# Patient Record
Sex: MALE | Race: WHITE | Employment: FULL TIME | URBAN - METROPOLITAN AREA
[De-identification: names, ages, dates, MRNs, and addresses within clinical notes are randomized per-mention and may not be internally consistent; named-entity substitution may affect disease eponyms.]

---

## 2017-05-10 ENCOUNTER — OFFICE VISIT (OUTPATIENT)
Dept: FAMILY MEDICINE CLINIC | Facility: CLINIC | Age: 62
End: 2017-05-10

## 2017-05-10 VITALS
SYSTOLIC BLOOD PRESSURE: 104 MMHG | OXYGEN SATURATION: 96 % | HEIGHT: 61.25 IN | DIASTOLIC BLOOD PRESSURE: 70 MMHG | BODY MASS INDEX: 32.24 KG/M2 | HEART RATE: 77 BPM | WEIGHT: 173 LBS

## 2017-05-10 DIAGNOSIS — E66.09 NON MORBID OBESITY DUE TO EXCESS CALORIES: ICD-10-CM

## 2017-05-10 DIAGNOSIS — G89.29 CHRONIC PAIN OF LEFT KNEE: ICD-10-CM

## 2017-05-10 DIAGNOSIS — Z00.01 ENCOUNTER FOR ROUTINE ADULT HEALTH EXAMINATION WITH ABNORMAL FINDINGS: Primary | ICD-10-CM

## 2017-05-10 DIAGNOSIS — M25.562 CHRONIC PAIN OF LEFT KNEE: ICD-10-CM

## 2017-05-10 DIAGNOSIS — Z28.21 IMMUNIZATION NOT CARRIED OUT BECAUSE OF PATIENT REFUSAL: ICD-10-CM

## 2017-05-10 DIAGNOSIS — Z87.891 FORMER SMOKER: ICD-10-CM

## 2017-05-10 DIAGNOSIS — Z12.11 ENCOUNTER FOR SCREENING COLONOSCOPY: ICD-10-CM

## 2017-05-10 PROCEDURE — 36415 COLL VENOUS BLD VENIPUNCTURE: CPT

## 2017-05-10 PROCEDURE — 99386 PREV VISIT NEW AGE 40-64: CPT

## 2017-05-10 RX ORDER — NAPROXEN 500 MG/1
500 TABLET ORAL 2 TIMES DAILY WITH MEALS
Qty: 60 TABLET | Refills: 1 | Status: SHIPPED | OUTPATIENT
Start: 2017-05-10 | End: 2019-07-09

## 2017-05-11 ENCOUNTER — TELEPHONE (OUTPATIENT)
Dept: FAMILY MEDICINE CLINIC | Facility: CLINIC | Age: 62
End: 2017-05-11

## 2017-05-11 ENCOUNTER — APPOINTMENT (OUTPATIENT)
Dept: LAB | Facility: HOSPITAL | Age: 62
End: 2017-05-11
Payer: MEDICAID

## 2017-05-11 DIAGNOSIS — Z00.01 ENCOUNTER FOR ROUTINE ADULT HEALTH EXAMINATION WITH ABNORMAL FINDINGS: ICD-10-CM

## 2017-05-11 PROCEDURE — 36415 COLL VENOUS BLD VENIPUNCTURE: CPT

## 2017-05-11 PROCEDURE — 81003 URINALYSIS AUTO W/O SCOPE: CPT

## 2017-05-11 PROCEDURE — 80061 LIPID PANEL: CPT

## 2017-05-11 PROCEDURE — 80053 COMPREHEN METABOLIC PANEL: CPT

## 2017-05-11 PROCEDURE — 85027 COMPLETE CBC AUTOMATED: CPT

## 2017-05-11 NOTE — PATIENT INSTRUCTIONS
Pautas de prevención para hombres de 48 a 59 años de [address] [facility] de detección y las vacunas son importantes para el manejo de lu nasir.  La consejería sobre lu nasir también es esencial. A continuación, verá pautas en relación con esos temas para ho Cáncer de pulmón Adultos entre 54 y [de-identified] años que hayan fumado Detección anual para los que tienen antecedentes de fumar 27 paquetes por año o que dejaron de fumar en los últimos 13 años   Obesidad Todos los hombres de ancelmo shellie de edad En los exámenes de [place] Lisa Keith (“MMR” por isiah siglas en [facility]) Todos los hombres de ancelmo shellie de edad hasta alrededor de los 61 años que no tienen registro de mag tenido estas infecciones o haberse aplicado estas vacunas [address] dosis.  Consulte a lu pr © 3215-4646 [address], [address] [place]. All rights reserved. This information is not intended as a substitute for professional medical care. Always follow your healthcare professional's instructions.

## 2017-05-11 NOTE — PROGRESS NOTES
CC: Annual Physical Exam    HPI:   Karuna Sims is a 62year old male who presents for a complete physical exam. Pt complains of intermittent L knee pain for the past few months.  He states that his knee sometimes feels stiff but his symptoms improve as the or swelling, + joint pain and stiffness. NEUROLOGICAL:  Denies headache, seizures, dizziness, syncope, paralysis, ataxia, numbness or tingling in the extremities,change in bowel or bladder control. HEMATOLOGIC:  Denies anemia, bleeding or bruising.   LYMP normal gait, strength and tone, sensory intact, normal reflexes.     ASSESSMENT AND PLAN:   Kristin Nguyen is a 62year old male who presents for a complete physical exam.      Health maintenance, will check:   Orders Placed This Encounter  CBC, Platelet, No

## 2017-05-12 DIAGNOSIS — E78.2 MIXED HYPERLIPIDEMIA: Primary | ICD-10-CM

## 2017-05-12 PROBLEM — R73.09 ELEVATED GLUCOSE LEVEL: Status: ACTIVE | Noted: 2017-05-12

## 2017-05-17 ENCOUNTER — TELEPHONE (OUTPATIENT)
Dept: FAMILY MEDICINE CLINIC | Facility: CLINIC | Age: 62
End: 2017-05-17

## 2017-05-17 RX ORDER — FENOFIBRATE 160 MG/1
160 TABLET ORAL DAILY
Qty: 30 TABLET | Refills: 2 | Status: SHIPPED | OUTPATIENT
Start: 2017-05-17 | End: 2017-08-10

## 2017-05-17 NOTE — TELEPHONE ENCOUNTER
Per Dr Chanelle Foss have continue medication he brought from [facility], (Controlip) which is the same as Fenofibrate and send new Rx for it.   Rx sent to his pharmacy and patient informed about results and instructions to continue with cholesterol medication, Dr Chanelle Foss

## 2017-05-17 NOTE — TELEPHONE ENCOUNTER
----- Message from Maricruz Paredes MD sent at 5/12/2017  2:42 AM CDT -----  Recommend diet/exercise and Omega 3 fish oil pills 2-4g PO daily and repeat BW (CMP, lipid panel) in 3 months; ok to file.

## 2017-08-09 ENCOUNTER — TELEPHONE (OUTPATIENT)
Dept: FAMILY MEDICINE CLINIC | Facility: CLINIC | Age: 62
End: 2017-08-09

## 2017-08-10 RX ORDER — FENOFIBRATE 160 MG/1
160 TABLET ORAL DAILY
Qty: 30 TABLET | Refills: 0 | Status: SHIPPED | OUTPATIENT
Start: 2017-08-10 | End: 2017-09-07

## 2017-09-07 ENCOUNTER — OFFICE VISIT (OUTPATIENT)
Dept: FAMILY MEDICINE CLINIC | Facility: CLINIC | Age: 62
End: 2017-09-07

## 2017-09-07 VITALS
BODY MASS INDEX: 33 KG/M2 | OXYGEN SATURATION: 98 % | WEIGHT: 175 LBS | SYSTOLIC BLOOD PRESSURE: 120 MMHG | DIASTOLIC BLOOD PRESSURE: 70 MMHG | HEART RATE: 80 BPM

## 2017-09-07 DIAGNOSIS — E78.2 MIXED HYPERLIPIDEMIA: ICD-10-CM

## 2017-09-07 DIAGNOSIS — J06.9 VIRAL UPPER RESPIRATORY TRACT INFECTION: Primary | ICD-10-CM

## 2017-09-07 DIAGNOSIS — Z13.31 DEPRESSION SCREENING: ICD-10-CM

## 2017-09-07 PROCEDURE — 99214 OFFICE O/P EST MOD 30 MIN: CPT

## 2017-09-07 RX ORDER — PROMETHAZINE HYDROCHLORIDE AND CODEINE PHOSPHATE 6.25; 1 MG/5ML; MG/5ML
5 SYRUP ORAL EVERY 6 HOURS PRN
Qty: 240 ML | Refills: 0 | Status: SHIPPED | OUTPATIENT
Start: 2017-09-07 | End: 2017-11-22 | Stop reason: ALTCHOICE

## 2017-09-07 RX ORDER — FENOFIBRATE 160 MG/1
160 TABLET ORAL DAILY
Qty: 30 TABLET | Refills: 0 | Status: SHIPPED | OUTPATIENT
Start: 2017-09-07 | End: 2017-09-21

## 2017-09-08 PROBLEM — J06.9 VIRAL UPPER RESPIRATORY TRACT INFECTION: Status: ACTIVE | Noted: 2017-09-08

## 2017-09-08 PROBLEM — Z13.31 DEPRESSION SCREENING: Status: ACTIVE | Noted: 2017-09-08

## 2017-09-08 NOTE — PATIENT INSTRUCTIONS
Enfermedad Respiratoria Viral [Uri, Viral Respiratory Illness, Adult, No Abx]  Usted tiene rayna enfermedad respiratoria de las vías superiores provocada por un virus. Esta enfermedad es contagiosa eloy los primeros días.  Se transmite por el aire, por l Busque Prontamente Atención Médica  si algo de lo siguiente ocurre:  -- Tos con esputo de color (mucosidad) o con jose.   -- Dolor en el pecho, falta de aire, silbidos o dificultad para respirar.  -- Dolor de alba aisha, dolor en la emma, el sumanth o l

## 2017-09-08 NOTE — PROGRESS NOTES
HPI:    Patient ID: Lexi Aguirre is a 62year old male. Hyperlipidemia   This is a chronic problem. The current episode started more than 1 year ago. The problem is uncontrolled. Exacerbating diseases include obesity.  Factors aggravating his hyperlipide for headaches. Negative for dizziness, focal weakness, syncope and light-headedness. Psychiatric/Behavioral: Positive for sleep disturbance. All other systems reviewed and are negative.              Current Outpatient Prescriptions:  Fenofibrate 160 MG importance of compliance with medication regimen reviewed and discussed with pt. Repeat CMP and lipid panel in 3 months.     3. Depression screening    Depression Screening (PHQ-2/PHQ-9): Over the LAST 2 WEEKS   Little interest or pleasure in doing things

## 2017-09-21 ENCOUNTER — TELEPHONE (OUTPATIENT)
Dept: FAMILY MEDICINE CLINIC | Facility: CLINIC | Age: 62
End: 2017-09-21

## 2017-09-21 DIAGNOSIS — E78.2 MIXED HYPERLIPIDEMIA: ICD-10-CM

## 2017-09-21 RX ORDER — FENOFIBRATE 160 MG/1
160 TABLET ORAL DAILY
Qty: 30 TABLET | Refills: 0 | Status: SHIPPED | OUTPATIENT
Start: 2017-09-21 | End: 2017-11-10

## 2017-09-21 NOTE — TELEPHONE ENCOUNTER
Patient called if we are able to resend the medication to his new pharmacy. [facility] Is not accepting his insurance card.

## 2017-11-10 ENCOUNTER — TELEPHONE (OUTPATIENT)
Dept: FAMILY MEDICINE CLINIC | Facility: CLINIC | Age: 62
End: 2017-11-10

## 2017-11-10 DIAGNOSIS — E78.2 MIXED HYPERLIPIDEMIA: ICD-10-CM

## 2017-11-10 RX ORDER — FENOFIBRATE 160 MG/1
160 TABLET ORAL DAILY
Qty: 15 TABLET | Refills: 0 | Status: SHIPPED | OUTPATIENT
Start: 2017-11-10 | End: 2017-11-22

## 2017-11-15 ENCOUNTER — APPOINTMENT (OUTPATIENT)
Dept: LAB | Facility: HOSPITAL | Age: 62
End: 2017-11-15
Payer: MEDICAID

## 2017-11-15 DIAGNOSIS — E78.2 MIXED HYPERLIPIDEMIA: ICD-10-CM

## 2017-11-15 PROCEDURE — 80061 LIPID PANEL: CPT

## 2017-11-15 PROCEDURE — 80053 COMPREHEN METABOLIC PANEL: CPT

## 2017-11-15 PROCEDURE — 36415 COLL VENOUS BLD VENIPUNCTURE: CPT

## 2017-11-22 ENCOUNTER — OFFICE VISIT (OUTPATIENT)
Dept: FAMILY MEDICINE CLINIC | Facility: CLINIC | Age: 62
End: 2017-11-22

## 2017-11-22 VITALS
OXYGEN SATURATION: 97 % | HEIGHT: 62 IN | DIASTOLIC BLOOD PRESSURE: 70 MMHG | SYSTOLIC BLOOD PRESSURE: 110 MMHG | WEIGHT: 174 LBS | HEART RATE: 59 BPM | BODY MASS INDEX: 32.02 KG/M2

## 2017-11-22 DIAGNOSIS — E78.2 MIXED HYPERLIPIDEMIA: Primary | ICD-10-CM

## 2017-11-22 DIAGNOSIS — Z23 NEED FOR INFLUENZA VACCINATION: ICD-10-CM

## 2017-11-22 PROBLEM — J06.9 VIRAL UPPER RESPIRATORY TRACT INFECTION: Status: RESOLVED | Noted: 2017-09-08 | Resolved: 2017-11-22

## 2017-11-22 PROCEDURE — 90686 IIV4 VACC NO PRSV 0.5 ML IM: CPT

## 2017-11-22 PROCEDURE — 99213 OFFICE O/P EST LOW 20 MIN: CPT

## 2017-11-22 PROCEDURE — 90471 IMMUNIZATION ADMIN: CPT

## 2017-11-22 RX ORDER — FENOFIBRATE 160 MG/1
160 TABLET ORAL DAILY
Qty: 30 TABLET | Refills: 2 | Status: SHIPPED | OUTPATIENT
Start: 2017-11-22 | End: 2019-07-09

## 2017-11-22 NOTE — PROGRESS NOTES
HPI:    Patient ID: Zenia Dyre is a 62year old male. Hyperlipidemia   This is a chronic problem. Episode onset: 6 months ago. The problem is uncontrolled. Exacerbating diseases include obesity.  Factors aggravating his hyperlipidemia include fatty zana Effort normal and breath sounds normal. No respiratory distress. Neurological: He is alert and oriented to person, place, and time. Skin: Skin is warm. No rash noted. Nursing note and vitals reviewed. ASSESSMENT/PLAN:   1.  Mixed hyperlipi

## 2017-11-22 NOTE — PATIENT INSTRUCTIONS
Cambios del estilo de rinku para controlar el colesterol  Seguir rayna dieta St jg, hacer ejercicio, controlar lu peso, dejar de fumar, manejar isiah niveles de estrés y natanael los medicamentos necesarios son todas cosas que pueden ayudarle a controlar lu n Lu proveedor de [address] puede recomendarle que stephanie más actividad física si no olivo estado haciendo ejercicio.  Según cuál sea lu situación, puede que lu proveedor le recomiende hacer actividad física de nivel entre moderado e intenso eloy al [employer] técnicas para manejar el estrés que le ayuden a lidiar con el estrés presente en lu rinku laboral y personal. [employer] puede disminuir en gran medida lu rriesgo de desarrollar rayna enfermedad cardiovascular.   Cómo aprovechar mejor los [facility] [employer] · Adultos que lion tenido un ataque cardíaco o cerebral, O adultos que lion tenido rayna enfermedad vascular periférica, un mini ataque cerebral (ataque isquémico transitorio), o rayna angina estable o inestable.  Bettye shellie también incluye a las personas que lion

## 2018-11-13 ENCOUNTER — HOSPITAL ENCOUNTER (OUTPATIENT)
Dept: GENERAL RADIOLOGY | Age: 63
Discharge: HOME | End: 2018-11-13
Attending: FAMILY MEDICINE

## 2018-11-13 ENCOUNTER — OFFICE VISIT (OUTPATIENT)
Dept: FAMILY MEDICINE CLINIC | Facility: CLINIC | Age: 63
End: 2018-11-13
Payer: COMMERCIAL

## 2018-11-13 VITALS
SYSTOLIC BLOOD PRESSURE: 122 MMHG | HEART RATE: 66 BPM | BODY MASS INDEX: 30 KG/M2 | DIASTOLIC BLOOD PRESSURE: 80 MMHG | WEIGHT: 164.69 LBS

## 2018-11-13 DIAGNOSIS — M54.50 BILATERAL LOW BACK PAIN WITHOUT SCIATICA, UNSPECIFIED CHRONICITY: Primary | ICD-10-CM

## 2018-11-13 DIAGNOSIS — M54.50 BILATERAL LOW BACK PAIN WITHOUT SCIATICA, UNSPECIFIED CHRONICITY: ICD-10-CM

## 2018-11-13 PROCEDURE — 72110 X-RAY EXAM L-2 SPINE 4/>VWS: CPT | Performed by: FAMILY MEDICINE

## 2018-11-13 PROCEDURE — 99213 OFFICE O/P EST LOW 20 MIN: CPT | Performed by: FAMILY MEDICINE

## 2018-11-13 RX ORDER — NAPROXEN 500 MG/1
500 TABLET ORAL 2 TIMES DAILY WITH MEALS
Qty: 60 TABLET | Refills: 1 | Status: SHIPPED | OUTPATIENT
Start: 2018-11-13 | End: 2018-12-04
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
## 2018-12-02 DIAGNOSIS — E78.2 MIXED HYPERLIPIDEMIA: ICD-10-CM

## 2018-12-03 PROBLEM — N52.9 ERECTILE DYSFUNCTION: Status: ACTIVE | Noted: 2018-12-03

## 2018-12-03 RX ORDER — FENOFIBRATE 160 MG/1
TABLET ORAL
Qty: 30 TABLET | Refills: 0 | OUTPATIENT
Start: 2018-12-03

## 2019-07-05 ENCOUNTER — LAB ENCOUNTER (OUTPATIENT)
Dept: LAB | Facility: HOSPITAL | Age: 64
End: 2019-07-05
Attending: UROLOGY
Payer: COMMERCIAL

## 2019-07-05 DIAGNOSIS — N40.1 HYPERTROPHY OF PROSTATE WITH URINARY OBSTRUCTION: Primary | ICD-10-CM

## 2019-07-05 DIAGNOSIS — N13.8 HYPERTROPHY OF PROSTATE WITH URINARY OBSTRUCTION: Primary | ICD-10-CM

## 2019-07-05 LAB — PSA SERPL-MCNC: 5.35 NG/ML (ref ?–4)

## 2019-07-05 PROCEDURE — 36415 COLL VENOUS BLD VENIPUNCTURE: CPT

## 2019-07-05 PROCEDURE — 84153 ASSAY OF PSA TOTAL: CPT

## 2019-07-25 ENCOUNTER — LAB ENCOUNTER (OUTPATIENT)
Dept: LAB | Facility: HOSPITAL | Age: 64
End: 2019-07-25
Attending: UROLOGY
Payer: COMMERCIAL

## 2019-07-25 DIAGNOSIS — R97.20 ELEVATED PROSTATE SPECIFIC ANTIGEN (PSA): Primary | ICD-10-CM

## 2019-07-25 LAB — PSA SERPL-MCNC: 4.62 NG/ML (ref ?–4)

## 2019-07-25 PROCEDURE — 84153 ASSAY OF PSA TOTAL: CPT

## 2019-07-25 PROCEDURE — 36415 COLL VENOUS BLD VENIPUNCTURE: CPT

## (undated) NOTE — MR AVS SNAPSHOT
[address] at [address] [place] [address].  [address]  [place] [address]  [phone]               Thank you for choosing us for your health care visit with Joy Vang MD.  We are glad to serve you and happy to prov [address] [place] [address]   Phone:  [phone]   Fax:  [phone]    Diagnoses:  Encounter for screening colonoscopy   Order:  Veda Tang [facility] [id number]   Phone:  [phone] 49 y 59 años de edad. Hable con lu proveedor de [facility] para asegurarse de que está al día con todo lo que necesita.   Prueba/Análisis Quiénes Frecuencia   Uso indebido del alcohol [employer] hombres de ancelmo shellie de edad En los exámenes de [facility] Cáncer de próstata A partir de los 39 años de edad, hable con lu proveedor de atención médica [employer] riesgos y los beneficios del examen rectal digital (\"ELIOT\" por isiah siglas en inglés) y las pruebas de detección de antígeno prostático específico (\"PS Antimeningocócica Los hombres con mayor riesgo de infección; hable con lu proveedor de atención médica [place] dosis o más   Antineumocócica conjugada (PCV13) y de polisacáridos (PPSV23) Los hombres con mayor riesgo de infección; hable con lu proveedor de aten This list is accurate as of: 5/10/17  9:32 PM.  Always use your most recent med list.                naproxen 500 MG Tabs   Take 1 tablet (500 mg total) by mouth 2 (two) times daily with meals.    Commonly known as:  NAPROSYN                Where to Get You Eat plenty of low-fat dairy products High fat meats and dairy   Choose whole grain products Foods high in sodium   Water is best for hydration Fast food.    Eat at home when possible     Tips for increasing your physical activity – Adults who are physically